# Patient Record
Sex: FEMALE | Race: WHITE | Employment: OTHER | URBAN - METROPOLITAN AREA
[De-identification: names, ages, dates, MRNs, and addresses within clinical notes are randomized per-mention and may not be internally consistent; named-entity substitution may affect disease eponyms.]

---

## 2017-06-08 ENCOUNTER — HOSPITAL ENCOUNTER (OUTPATIENT)
Dept: CT IMAGING | Age: 67
Discharge: HOME | End: 2017-06-08
Attending: NEUROLOGICAL SURGERY
Payer: MEDICARE

## 2017-06-08 DIAGNOSIS — Z98.2 PRESENCE OF CEREBROSPINAL FLUID DRAINAGE DEVICE: ICD-10-CM

## 2017-06-08 PROCEDURE — 70450 CT HEAD/BRAIN W/O DYE: CPT

## 2018-01-16 ENCOUNTER — OFFICE VISIT (OUTPATIENT)
Dept: FAMILY MEDICINE CLINIC | Age: 68
End: 2018-01-16

## 2018-01-16 VITALS
DIASTOLIC BLOOD PRESSURE: 77 MMHG | HEART RATE: 73 BPM | BODY MASS INDEX: 31.71 KG/M2 | HEIGHT: 63 IN | TEMPERATURE: 98 F | WEIGHT: 179 LBS | SYSTOLIC BLOOD PRESSURE: 136 MMHG | OXYGEN SATURATION: 95 % | RESPIRATION RATE: 17 BRPM

## 2018-01-16 DIAGNOSIS — N30.01 ACUTE CYSTITIS WITH HEMATURIA: Primary | ICD-10-CM

## 2018-01-16 LAB
BILIRUB UR QL STRIP: NEGATIVE
GLUCOSE UR-MCNC: NEGATIVE MG/DL
KETONES P FAST UR STRIP-MCNC: NEGATIVE MG/DL
PH UR STRIP: 7 [PH] (ref 4.6–8)
PROT UR QL STRIP: NEGATIVE
SP GR UR STRIP: 1.01 (ref 1–1.03)
UA UROBILINOGEN AMB POC: NORMAL (ref 0.2–1)
URINALYSIS CLARITY POC: NORMAL
URINALYSIS COLOR POC: NORMAL
URINE BLOOD POC: NORMAL
URINE LEUKOCYTES POC: NORMAL
URINE NITRITES POC: POSITIVE

## 2018-01-16 RX ORDER — NITROFURANTOIN 25; 75 MG/1; MG/1
100 CAPSULE ORAL 2 TIMES DAILY
Qty: 14 CAP | Refills: 0 | Status: SHIPPED | OUTPATIENT
Start: 2018-01-16 | End: 2018-01-23

## 2018-01-16 NOTE — PROGRESS NOTES
Subjective:     Josette Reyes is a 78 y.o. female who complains of dysuria, frequency, urgency for 1 days. Patient denies flank pain, vomiting, fever, unusual vaginal discharge. Patient does not have a history of recurrent UTI. Patient does not have a history of pyelonephritis. Patient Active Problem List   Diagnosis Code    Hydrocephalus G91.9    Shunt malfunction T85.618A     Patient Active Problem List    Diagnosis Date Noted    Shunt malfunction 10/14/2016    Hydrocephalus 04/12/2016     Current Outpatient Prescriptions   Medication Sig Dispense Refill    nitrofurantoin, macrocrystal-monohydrate, (MACROBID) 100 mg capsule Take 1 Cap by mouth two (2) times a day for 7 days. 14 Cap 0    losartan (COZAAR) 100 mg tablet Take 100 mg by mouth daily.  FLUoxetine (PROZAC) 20 mg capsule Take 20 mg by mouth daily.  b complex vitamins tablet Take 1 Tab by mouth daily.  clotrimazole-betamethasone (LOTRISONE) topical cream Apply  to affected area two (2) times daily as needed for Skin Irritation (under breasts).  desloratadine (CLARINEX) 5 mg tablet Take 5 mg by mouth daily as needed. Allergies   Allergen Reactions    Other Food Rash     POPPY SEED - RASH, ORAL SWELLING    Bactrim [Sulfamethoprim Ds] Rash    Chlorhexidine Towelette Hives    Corn Hives     Oral sores    Milk Hives     Oral sores when takes large liquid quantities, does not have reaction to foods baked with milk or small amounts of milk.      Past Medical History:   Diagnosis Date    Adverse effect of anesthesia     ABDOMINAL CRAMPING    Environmental allergies     HTN (hypertension)     Hydrocephalus     IBS (irritable bowel syndrome)     CONSTIPATION    MVP (mitral valve prolapse)      Past Surgical History:   Procedure Laterality Date    NEUROLOGICAL PROCEDURE UNLISTED  2000    third ventriculostomy     Family History   Problem Relation Age of Onset    Stroke Mother     Hypertension Mother     Heart Disease Father     Diabetes Father     Anesth Problems Neg Hx      Social History   Substance Use Topics    Smoking status: Former Smoker     Quit date: 7/30/1977    Smokeless tobacco: Never Used    Alcohol use Yes      Comment: occasional        Review of Systems  Pertinent items are noted in HPI. Objective:     Visit Vitals    /77 (BP 1 Location: Left arm, BP Patient Position: Sitting)    Pulse 73    Temp 98 °F (36.7 °C) (Oral)    Resp 17    Ht 5' 3\" (1.6 m)    Wt 179 lb (81.2 kg)    SpO2 95%    BMI 31.71 kg/m2     General:  alert, cooperative, no distress   Abdomen: soft, nontender, nondistended, no masses or organomegaly. Back:  CVA tenderness absent   :  defer exam     Laboratory:   Urine dipstick shows positive for RBC's, positive for nitrates and positive for leukocytes. Micro exam: not done. sent to the lab. Assessment/Plan:     Acute cystitis     1. nitrofurantoin  2. Maintain adequate hydration  3. May use OTC pyridium as desired, which will turn urine orange/red color  4. Follow up if symptoms not improving, and prn. ICD-10-CM ICD-9-CM    1. Acute cystitis with hematuria N30.01 595.0 nitrofurantoin, macrocrystal-monohydrate, (MACROBID) 100 mg capsule   .

## 2018-01-16 NOTE — PROGRESS NOTES
Chief Complaint   Patient presents with    Urinary Burning   pt c/o urinary burning x 1 day, pt states she took otc AZO to help with sx relief.

## 2018-01-16 NOTE — PATIENT INSTRUCTIONS

## 2018-01-17 LAB — BACTERIA UR CULT: NORMAL

## 2018-04-13 ENCOUNTER — APPOINTMENT (OUTPATIENT)
Dept: CT IMAGING | Age: 68
End: 2018-04-13
Attending: NURSE PRACTITIONER
Payer: MEDICARE

## 2018-04-13 ENCOUNTER — HOSPITAL ENCOUNTER (EMERGENCY)
Age: 68
Discharge: HOME | End: 2018-04-13
Attending: EMERGENCY MEDICINE | Admitting: INTERNAL MEDICINE
Payer: MEDICARE

## 2018-04-13 VITALS
WEIGHT: 188 LBS | HEART RATE: 82 BPM | TEMPERATURE: 98.1 F | DIASTOLIC BLOOD PRESSURE: 83 MMHG | BODY MASS INDEX: 33.3 KG/M2 | RESPIRATION RATE: 16 BRPM | OXYGEN SATURATION: 98 % | SYSTOLIC BLOOD PRESSURE: 130 MMHG

## 2018-04-13 DIAGNOSIS — R04.2 HEMOPTYSIS: Primary | ICD-10-CM

## 2018-04-13 DIAGNOSIS — R91.8 LUNG MASS: ICD-10-CM

## 2018-04-13 DIAGNOSIS — E27.8 ADRENAL MASS (HCC): ICD-10-CM

## 2018-04-13 LAB
ALBUMIN SERPL-MCNC: 3.9 G/DL (ref 3.5–5)
ALBUMIN/GLOB SERPL: 0.8 {RATIO} (ref 1.1–2.2)
ALP SERPL-CCNC: 68 U/L (ref 45–117)
ALT SERPL-CCNC: 32 U/L (ref 12–78)
ANION GAP SERPL CALC-SCNC: 4 MMOL/L (ref 5–15)
APTT PPP: 36.8 SEC (ref 22.1–32)
AST SERPL-CCNC: 19 U/L (ref 15–37)
BASOPHILS # BLD: 0 K/UL (ref 0–0.1)
BASOPHILS NFR BLD: 0 % (ref 0–1)
BILIRUB SERPL-MCNC: 0.3 MG/DL (ref 0.2–1)
BUN SERPL-MCNC: 13 MG/DL (ref 6–20)
BUN/CREAT SERPL: 14 (ref 12–20)
CALCIUM SERPL-MCNC: 10.1 MG/DL (ref 8.5–10.1)
CHLORIDE SERPL-SCNC: 106 MMOL/L (ref 97–108)
CO2 SERPL-SCNC: 28 MMOL/L (ref 21–32)
CREAT SERPL-MCNC: 0.93 MG/DL (ref 0.55–1.02)
DIFFERENTIAL METHOD BLD: NORMAL
EOSINOPHIL # BLD: 0 K/UL (ref 0–0.4)
EOSINOPHIL NFR BLD: 0 % (ref 0–7)
ERYTHROCYTE [DISTWIDTH] IN BLOOD BY AUTOMATED COUNT: 12.9 % (ref 11.5–14.5)
GLOBULIN SER CALC-MCNC: 4.6 G/DL (ref 2–4)
GLUCOSE SERPL-MCNC: 96 MG/DL (ref 65–100)
HCT VFR BLD AUTO: 44.2 % (ref 35–47)
HGB BLD-MCNC: 14.1 G/DL (ref 11.5–16)
IMM GRANULOCYTES # BLD: 0 K/UL (ref 0–0.04)
IMM GRANULOCYTES NFR BLD AUTO: 0 % (ref 0–0.5)
INR PPP: 1.1 (ref 0.9–1.1)
LYMPHOCYTES # BLD: 1.7 K/UL (ref 0.8–3.5)
LYMPHOCYTES NFR BLD: 28 % (ref 12–49)
MCH RBC QN AUTO: 29.2 PG (ref 26–34)
MCHC RBC AUTO-ENTMCNC: 31.9 G/DL (ref 30–36.5)
MCV RBC AUTO: 91.5 FL (ref 80–99)
MONOCYTES # BLD: 0.4 K/UL (ref 0–1)
MONOCYTES NFR BLD: 6 % (ref 5–13)
NEUTS SEG # BLD: 3.8 K/UL (ref 1.8–8)
NEUTS SEG NFR BLD: 65 % (ref 32–75)
NRBC # BLD: 0 K/UL (ref 0–0.01)
NRBC BLD-RTO: 0 PER 100 WBC
PLATELET # BLD AUTO: 357 K/UL (ref 150–400)
PMV BLD AUTO: 9.2 FL (ref 8.9–12.9)
POTASSIUM SERPL-SCNC: 4.3 MMOL/L (ref 3.5–5.1)
PROT SERPL-MCNC: 8.5 G/DL (ref 6.4–8.2)
PROTHROMBIN TIME: 10.8 SEC (ref 9–11.1)
RBC # BLD AUTO: 4.83 M/UL (ref 3.8–5.2)
SODIUM SERPL-SCNC: 138 MMOL/L (ref 136–145)
THERAPEUTIC RANGE,PTTT: ABNORMAL SECS (ref 58–77)
WBC # BLD AUTO: 5.9 K/UL (ref 3.6–11)

## 2018-04-13 PROCEDURE — 85610 PROTHROMBIN TIME: CPT | Performed by: EMERGENCY MEDICINE

## 2018-04-13 PROCEDURE — 71275 CT ANGIOGRAPHY CHEST: CPT

## 2018-04-13 PROCEDURE — 80053 COMPREHEN METABOLIC PANEL: CPT | Performed by: NURSE PRACTITIONER

## 2018-04-13 PROCEDURE — 85025 COMPLETE CBC W/AUTO DIFF WBC: CPT | Performed by: NURSE PRACTITIONER

## 2018-04-13 PROCEDURE — 74011000258 HC RX REV CODE- 258: Performed by: EMERGENCY MEDICINE

## 2018-04-13 PROCEDURE — 65270000029 HC RM PRIVATE

## 2018-04-13 PROCEDURE — 85730 THROMBOPLASTIN TIME PARTIAL: CPT | Performed by: EMERGENCY MEDICINE

## 2018-04-13 PROCEDURE — 99284 EMERGENCY DEPT VISIT MOD MDM: CPT

## 2018-04-13 PROCEDURE — 93005 ELECTROCARDIOGRAM TRACING: CPT

## 2018-04-13 PROCEDURE — 36415 COLL VENOUS BLD VENIPUNCTURE: CPT | Performed by: EMERGENCY MEDICINE

## 2018-04-13 PROCEDURE — 74011636320 HC RX REV CODE- 636/320: Performed by: EMERGENCY MEDICINE

## 2018-04-13 RX ORDER — SODIUM CHLORIDE 0.9 % (FLUSH) 0.9 %
10 SYRINGE (ML) INJECTION
Status: COMPLETED | OUTPATIENT
Start: 2018-04-13 | End: 2018-04-13

## 2018-04-13 RX ORDER — VALSARTAN 320 MG/1
320 TABLET ORAL DAILY
COMMUNITY
End: 2019-03-05 | Stop reason: ALTCHOICE

## 2018-04-13 RX ORDER — CHOLECALCIFEROL TAB 125 MCG (5000 UNIT) 125 MCG
5000 TAB ORAL DAILY
COMMUNITY

## 2018-04-13 RX ORDER — LEVOFLOXACIN 500 MG/1
500 TABLET, FILM COATED ORAL DAILY
COMMUNITY
End: 2019-03-05 | Stop reason: ALTCHOICE

## 2018-04-13 RX ADMIN — IOPAMIDOL 100 ML: 755 INJECTION, SOLUTION INTRAVENOUS at 14:04

## 2018-04-13 RX ADMIN — SODIUM CHLORIDE 100 ML: 900 INJECTION, SOLUTION INTRAVENOUS at 14:04

## 2018-04-13 RX ADMIN — Medication 10 ML: at 14:04

## 2018-04-13 NOTE — CONSULTS
Gaurav Franco MD, [facility]                   NAME:  Ary Mckenzie  :  1950  MRN:  [id number]  Date of Service:  2018    Reason for Consult: hemoptysis and lung mass    Consulting Physician: Dr Keyanna Murphy    Chief Complaint: coughing up blood    History of Present Illness: 79 yo woman with HTN, h/o hydrocephalus s/p  shunt, IBS, and h/o positive PPD and asbestosis exposure s/p INH x4 months who presents from home with coughing up blood since yesterday. She denies SOB, CP, palpitations, night sweats, weight loss, bleeding from elsewhere, HA, lightheadedness, dizziness, f/c/n/v. She takes ASA 3x weekly and Fannie-seltzer as needed. She reports \"gurgling\" and cough x4 months. She did not cough up blood until yesterday but has had multiple episodes since. ED triage VS were temp 98.2F, HR 87, /91, RR 18, SpO2 97% on RA. CTA chest in the ED showed left hilar mass, right adrenal mass, and no PE. Medical History:  Past Medical History:   Diagnosis Date    Adverse effect of anesthesia     ABDOMINAL CRAMPING    Environmental allergies     HTN (hypertension)     Hydrocephalus     IBS (irritable bowel syndrome)     CONSTIPATION    MVP (mitral valve prolapse)        Surgical History:  Past Surgical History:   Procedure Laterality Date    NEUROLOGICAL PROCEDURE UNLISTED      third ventriculostomy       Social History:  Social History     Social History    Marital status:      Spouse name: N/A    Number of children: N/A    Years of education: N/A     Occupational History    Not on file.      Social History Main Topics    Smoking status: Former Smoker     Quit date: 1977    Smokeless tobacco: Never Used    Alcohol use Yes      Comment: occasional    Drug use: No    Sexual activity: Not on file     Other Topics Concern    Not on file     Social History Narrative       Family History:  Family History   Problem Relation Age of Onset    Stroke Mother    [address] [facility] Abdi Hypertension Mother     Heart Disease Father     Diabetes Father     Anesth Problems Neg Hx        Allergies: Allergies   Allergen Reactions    Other Food Rash     POPPY SEED - RASH, ORAL SWELLING    Bactrim [Sulfamethoprim Ds] Rash    Chlorhexidine Towelette Hives    Corn Hives     Oral sores    Milk Hives     Oral sores when takes large liquid quantities, does not have reaction to foods baked with milk or small amounts of milk. Medications outpatient:  No current facility-administered medications on file prior to encounter. Current Outpatient Prescriptions on File Prior to Encounter   Medication Sig Dispense Refill    b complex vitamins tablet Take 1 Tab by mouth daily.  desloratadine (CLARINEX) 5 mg tablet Take 5 mg by mouth daily as needed. Medications inpatient:  No current facility-administered medications for this encounter. Current Outpatient Prescriptions   Medication Sig    levoFLOXacin (LEVAQUIN) 500 mg tablet Take 500 mg by mouth daily. For 7 days    valsartan (DIOVAN) 320 mg tablet Take 320 mg by mouth daily.  cholecalciferol, VITAMIN D3, (VITAMIN D3) 5,000 unit tab tablet Take 5,000 Units by mouth daily.  b complex vitamins tablet Take 1 Tab by mouth daily.  desloratadine (CLARINEX) 5 mg tablet Take 5 mg by mouth daily as needed. Review of Systems:  A comprehensive review of systems was negative except for that written in the HPI.     PHYSICAL EXAM:  Vital signs:  Visit Vitals    /77    Pulse 67    Temp 98.2 °F (36.8 °C)    Resp 15    Wt 85.3 kg (188 lb)    SpO2 97%    BMI 33.3 kg/m2        General: awake, no acute distress, cooperative   EENT: PERRL, EOMI, oral mucosa moist, oropharynx benign  Resp: few scattered b/l  rales, no wheeze  CV: regular rhythm, normal rate, no m/r/g appreciated, no peripheral edema, +pulses  GI: +BS, soft, non distended, non tender  MSK: moves all extremities  Neurologic: AAOx3, NFD  Psych: good insight, not anxious nor agitated  Skin: warm, dry    Lab Data Personally Reviewed: (see below)    ECG: NSR rate 68, no ST-T wave changes    CTA chest  CT Results  (Last 48 hours)               04/13/18 1448  CTA CHEST W OR W WO CONT Final result    Impression:  IMPRESSION:        Left hilar infiltrative appearing mass measuring 4.0 x 1.9 x 4.8 cm in size. Two cm right adrenal mass. Differential consideration would include   nonhyperfunctioning adenoma versus metastatic disease. PET imaging study may be   helpful for further characterization if indicated clinically. No evidence of pulmonary embolism. Incidental multiple rounded soft tissue structures in the left breast. Recommend   correlation with mammography and/or ultrasound. Telephone report to Dr. Dina Pickens in the emergency room at 1500 hours. Narrative:  INDICATION: Chest pain. Hemoptysis. EXAM: Preliminary  images were obtained. Multiple contiguous helically acquired images were obtained through the thorax   following intravenous contrast 100 ccs administration. 3D post processing was performed. Coronal reconstructions were performed. CT dose reduction was achieved through the use of a standardized protocol   tailored for this examination and automatic exposure control for dose   modulation. FINDINGS: Images through the pulmonary vessels reveal no filling defects or   meniscus to suggest pulmonary embolic process. No evidence of aortic dissection. Images through the left lynette reveal soft tissue fullness with mass effect. Left   hilar infiltrative appearing mass measuring 4.0 x 1.9 x 4.8 cm in size. Images through the upper abdomen demonstrate no gross space-occupying lesion   involving the visualized portion of the liver. The spleen is normal in size. Incidental note made of partly calcified right adrenal mass measuring 2.0 x 1.5   cm.                   Assessment/Plan:  Hemoptysis and left hilar lung mass (POA)  - pt is hemodynamically stable  - CTA chest as above  - spoke with Dr Ana Mike, [place] [facility], by phone who stated that patient would not be able to get bronchoscopy until Monday 4/16 at that earliest and if patient is stable and feels ok to go, she can follow up with PAR on Monday. [facility] office will call patient on Mon 4/16 to set up appointment and bronchoscopy with biopsy  - pt advised that if she does not hear from [id number] office on Mon am, she should call them  - pt also advised that if she has worsening hemoptysis, lightheadedness, CP, SOB, fatigue, etc she should return to the ED or call 911    Right adrenal mass (POA) - needs PET/CT; f/u with PCP and/or Heme/Onc    H/o +PPD and asbestosis exposure - no cavitary lung mass on CTA to suggest active TB  Patient is stable for discharge home as she is hemodynamically stable, Pulmonologist has been consulted, and patient is willing to go home and f/u with Pulmonary as an outpatient. Patient is agreeable to the plan and is aware she should return to the ED if symptoms arise or hemoptysis worsens. Care Plan discussed with:  Patient/Family, Nurse and Consultant Pulmonary Dr Ana Mike by phone    Total NON critical care TIME:  48 minutes    Alphonse Vega MD     Procedures: see electronic medical records for all procedures/Xrays and details which were not copied into this note but were reviewed prior to creation of Plan. LABS:  Recent Labs      04/13/18   1215   WBC  5.9   HGB  14.1   HCT  44.2   PLT  357     Recent Labs      04/13/18   1215   NA  138   K  4.3   CL  106   CO2  28   BUN  13   CREA  0.93   GLU  96   CA  10.1     Recent Labs      04/13/18   1215   SGOT  19   ALT  32   AP  68   TBILI  0.3   TP  8.5*   ALB  3.9   GLOB  4.6*     Recent Labs      04/13/18   1215   INR  1.1   PTP  10.8   APTT  36.8*      No results for input(s): FE, TIBC, PSAT, FERR in the last 72 hours.    No results found for: FOL, RBCF   No results for input(s): PH, PCO2, PO2 in the last 72 hours. No results for input(s): CPK, CKNDX, TROIQ in the last 72 hours.     No lab exists for component: CPKMB  No results found for: CHOL, CHOLX, CHLST, CHOLV, HDL, LDL, LDLC, DLDLP, TGLX, TRIGL, TRIGP, CHHD, CHHDX  No results found for: [facility]  Lab Results   Component Value Date/Time    Color YELLOW/STRAW 10/14/2016 08:58 PM    Appearance CLEAR 10/14/2016 08:58 PM    Specific gravity 1.008 10/14/2016 08:58 PM    pH (UA) 6.0 10/14/2016 08:58 PM    Protein NEGATIVE  10/14/2016 08:58 PM    Glucose NEGATIVE  10/14/2016 08:58 PM    Ketone NEGATIVE  10/14/2016 08:58 PM    Bilirubin NEGATIVE  10/14/2016 08:58 PM    Urobilinogen 0.2 10/14/2016 08:58 PM    Nitrites NEGATIVE  10/14/2016 08:58 PM    Leukocyte Esterase TRACE (A) 10/14/2016 08:58 PM    Epithelial cells FEW 10/14/2016 08:58 PM    Bacteria NEGATIVE  10/14/2016 08:58 PM    WBC 0-4 10/14/2016 08:58 PM    RBC 0-5 10/14/2016 08:58 PM

## 2018-04-13 NOTE — ED PROVIDER NOTES
HPI Comments: 78 y.o. female with past medical history significant for HTN and IBS who presents from home via private vehicle with chief complaint of hemptysis. There are no other acute medical concerns at this time. Social hx: Former smoker (Quit 6100); Occasional EtOH use  PCP: Pushpa Horowitz MD    Note written by Corrinne Alto, Scribe, as dictated by Petra Grant MD 11:58 AM        The history is provided by the patient. No  was used. Past Medical History:   Diagnosis Date    Adverse effect of anesthesia     ABDOMINAL CRAMPING    Environmental allergies     HTN (hypertension)     Hydrocephalus     IBS (irritable bowel syndrome)     CONSTIPATION    MVP (mitral valve prolapse)        Past Surgical History:   Procedure Laterality Date    NEUROLOGICAL PROCEDURE UNLISTED  2000    third ventriculostomy         Family History:   Problem Relation Age of Onset    Stroke Mother     Hypertension Mother     Heart Disease Father     Diabetes Father     Anesth Problems Neg Hx        Social History     Social History    Marital status:      Spouse name: N/A    Number of children: N/A    Years of education: N/A     Occupational History    Not on file. Social History Main Topics    Smoking status: Former Smoker     Quit date: 7/30/1977    Smokeless tobacco: Never Used    Alcohol use Yes      Comment: occasional    Drug use: No    Sexual activity: Not on file     Other Topics Concern    Not on file     Social History Narrative         ALLERGIES: Other food; Bactrim [sulfamethoprim ds]; Chlorhexidine towelette; Corn; and Milk    Review of Systems   Constitutional: Negative for activity change, appetite change and fatigue. HENT: Negative for ear pain, facial swelling, sore throat and trouble swallowing. Eyes: Negative for pain, discharge and visual disturbance. Respiratory: Negative for chest tightness, shortness of breath and wheezing. Cardiovascular: Negative for chest pain and palpitations. Gastrointestinal: Negative for abdominal pain, blood in stool, nausea and vomiting. Genitourinary: Negative for difficulty urinating, flank pain and hematuria. Musculoskeletal: Negative for arthralgias, joint swelling, myalgias and neck pain. Skin: Negative for color change and rash. Neurological: Negative for dizziness, weakness, numbness and headaches. Hematological: Negative for adenopathy. Does not bruise/bleed easily. Psychiatric/Behavioral: Negative for behavioral problems, confusion and sleep disturbance. All other systems reviewed and are negative. Vitals:    04/13/18 1050   BP: (!) 143/91   Pulse: 87   Resp: 18   Temp: 98.2 °F (36.8 °C)   SpO2: 97%   Weight: 85.3 kg (188 lb)            Physical Exam   Constitutional: She is oriented to person, place, and time. She appears well-developed and well-nourished. No distress. HENT:   Head: Normocephalic and atraumatic. Nose: Nose normal.   Mouth/Throat: Oropharynx is clear and moist.   Eyes: Conjunctivae and EOM are normal. Pupils are equal, round, and reactive to light. No scleral icterus. Neck: Normal range of motion. Neck supple. No JVD present. No tracheal deviation present. No thyromegaly present. No carotid bruits noted. Cardiovascular: Normal rate, regular rhythm, normal heart sounds and intact distal pulses. Exam reveals no gallop and no friction rub. No murmur heard. Pulmonary/Chest: Effort normal and breath sounds normal. No respiratory distress. She has no wheezes. She has no rales. She exhibits no tenderness. Abdominal: Soft. Bowel sounds are normal. She exhibits no distension and no mass. There is no tenderness. There is no rebound and no guarding. Musculoskeletal: Normal range of motion. She exhibits no edema or tenderness. Lymphadenopathy:     She has no cervical adenopathy. Neurological: She is alert and oriented to person, place, and time.  She has normal reflexes. No cranial nerve deficit. Coordination normal.   Skin: Skin is warm and dry. No rash noted. No erythema. Psychiatric: She has a normal mood and affect. Her behavior is normal. Judgment and thought content normal.   Nursing note and vitals reviewed. Note written by Moi Zheng, as dictated by Patrica Sutton MD 11:58 AM     MDM  Number of Diagnoses or Management Options  Hemoptysis:      Amount and/or Complexity of Data Reviewed  Clinical lab tests: ordered and reviewed  Tests in the radiology section of CPT®: ordered and reviewed  Decide to obtain previous medical records or to obtain history from someone other than the patient: yes  Review and summarize past medical records: yes  Discuss the patient with other providers: yes  Independent visualization of images, tracings, or specimens: yes    Risk of Complications, Morbidity, and/or Mortality  Presenting problems: high  Diagnostic procedures: high  Management options: high    Patient Progress  Patient progress: stable        ED Course       Procedures      ED EKG interpretation:  Rhythm: normal sinus rhythm; and regular . Rate (approx.): 68 bpm; Axis: left axis deviation; ST/T wave: normal; No ectopy; No ischemic changes. Note written by Moi Zheng, as dictated by Patrica Sutton MD 12:23 PM      CONSULT NOTE:  1:40 PM Patrica Sutton MD spoke with Dr. Tamika Cross, Consult for Pulmonology. Discussed available diagnostic tests and clinical findings. Dr. Tamika Cross states the chance this is TB is nill. WBC count is normal, CXR is clear, just waiting on CTA chest.      2:53 PM  Still awaiting the CT scan. The chest x-ray and labs are unremarkable. I have discussed with pulmonary and given the fact the patient has a normal chest x-ray, no constitutional symptoms to suggest TB, and normal vital signs, there is little likelihood of TB. CT scan returns with finding of hilar mass and adrenal mass.  Concern for lung CA with mets. Will consult hospitalist to admit for further evaluation and care. The patient expressed concern about being admitted to the hospital over the weekend and not having anything done. She will discussed with the hospitalist when he sees her and depending on the plans, she may be discharged home for followup next week.

## 2018-04-13 NOTE — CONSULTS
[facility]    D/W Dr. Candy Stephen. CT shows lung mass and adrenal lesions. She had an episode of hemoptysis yesterday, none since. Patient wants to go home. Needs bronch and EBUS for tissue biopsy. We will be in touch with her on Monday to schedule appt for evaluation and procedure.     Mauri Jean MD

## 2018-04-13 NOTE — ED NOTES
5:39 PM  Informed by Dr. Alonso Bui that patient is being discharged to follow up with pulmonology as an outpatient.

## 2018-04-13 NOTE — PROGRESS NOTES
Admission Medication Reconciliation:    Information obtained from:  Patient, RxQuery    Comments/Recommendations: Updated PTA meds/reviewed patient's allergies. 1)  Added:  - Levofloxacin 500 mg 1 tab daily for 7 days; patient is currently on day 2 of therapy and has already taken her dose today (4/13/18). - Valsartan 320 mg 1 tab daily  - Cholecalciferol 5000 units 1 tab daily    2)  Removed:  - Clotrimazole-betamethasone cream  - Fluoxetine  - Losartan  Patient indicated these are not current meds. 3)  Reviewed allergies with patient. No changes. Significant PMH/Disease States:   Past Medical History:   Diagnosis Date    Adverse effect of anesthesia     ABDOMINAL CRAMPING    Environmental allergies     HTN (hypertension)     Hydrocephalus     IBS (irritable bowel syndrome)     CONSTIPATION    MVP (mitral valve prolapse)        Chief Complaint for this Admission:    Chief Complaint   Patient presents with    Hemoptysis       Allergies: Other food; Bactrim [sulfamethoprim ds]; Chlorhexidine towelette; Corn; and Milk    Prior to Admission Medications:   Prior to Admission Medications   Prescriptions Last Dose Informant Patient Reported? Taking?   b complex vitamins tablet 4/13/2018 at Unknown time  Yes Yes   Sig: Take 1 Tab by mouth daily. cholecalciferol, VITAMIN D3, (VITAMIN D3) 5,000 unit tab tablet 4/13/2018 at Unknown time  Yes Yes   Sig: Take 5,000 Units by mouth daily. desloratadine (CLARINEX) 5 mg tablet 4/13/2018 at Unknown time  Yes Yes   Sig: Take 5 mg by mouth daily as needed. levoFLOXacin (LEVAQUIN) 500 mg tablet 4/13/2018 at Unknown time  Yes Yes   Sig: Take 500 mg by mouth daily. For 7 days   valsartan (DIOVAN) 320 mg tablet 4/13/2018 at Unknown time  Yes Yes   Sig: Take 320 mg by mouth daily.       Facility-Administered Medications: None         Written by Tanya Ontiveros, PharmD Candidate [id number]

## 2018-04-13 NOTE — Clinical Note
Status[de-identified] Inpatient [101] Type of Bed: Medical [8] Inpatient Hospitalization Certified Necessary for the Following Reasons: 3. Patient receiving treatment that can only be provided in an inpatient setting (further clarification in H&P documentation) Admitting Diagnosis: Hemoptysis [1414212] Admitting Physician: Betsy Day [[id number]] Attending Physician: Boogie Almaguer, [address] Estimated Length of Stay: 3-4 Midnights Discharge Plan[de-identified] Extended Care Facility (e.g. Adult Home, Nursing Home, etc.)

## 2018-04-13 NOTE — ED NOTES
10:50 AM  I have evaluated the patient as the Provider in Triage. I have reviewed Her vital signs and the triage nurse assessment. I have talked with the patient and any available family and advised that I am the provider in triage and have ordered the appropriate study to initiate their work up based on the clinical presentation during my assessment. I have advised that the patient will be accommodated in the Main ED as soon as possible. I have also requested to contact the triage nurse or myself immediately if the patient experiences any changes in their condition during this brief waiting period.   Barbara Crawford, NP

## 2018-04-13 NOTE — ED TRIAGE NOTES
TP arrives with report of coughing up bright red blood. 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 VSS.

## 2018-04-13 NOTE — ED NOTES
Discharge instructions reviewed with patient and , pulmonary phone number pointed and verbalization of understanding to make appt for follow up. Vital signs obtained, no respiratory distress noted and patient is ambulatory out of ED with steady gait and  at side.

## 2018-04-13 NOTE — DISCHARGE INSTRUCTIONS
Coughing Up Blood: Care Instructions  Your Care Instructions    Coughing up blood can be frightening. The blood may come from the lungs, stomach, or throat. You may cough up a few thin streaks of bright red blood. This probably is not a cause for concern. Coughing up large amounts of bright red blood or rust-colored mucus from the lungs can be a symptom of a more serious condition. Several conditions can make you cough up blood from the lungs. These include bronchitis and pneumonia, or more serious problems such as cancer or a blood clot in the lung (pulmonary embolus). Depending on what is causing your cough, it may go away after the illness is treated. Your doctor may tell you not to suppress the cough with cough medicine if it is better for you to cough up the blood and spit it out. Follow-up care is a key part of your treatment and safety. Be sure to make and go to all appointments, and call your doctor if you are having problems. It's also a good idea to know your test results and keep a list of the medicines you take. How can you care for yourself at home? · Make a note of when and for how long you cough up blood. Also note if you are coughing up spit with a small amount of blood, or mostly blood. Take this information to your next appointment with your doctor. · Increase your fluid intake to at least 8 to 10 glasses of water every day. This helps keep the mucus thin and helps you cough it up. If you have kidney, heart, or liver disease and have to limit fluids, talk with your doctor before you increase your fluid intake. · If your doctor prescribed antibiotics, take them as directed. Do not stop taking them just because you feel better. You need to take the full course of antibiotics. · Do not take cough medicine without your doctor's guidance. They can cause problems if you have other health problems. They can also interact with other medicine.   · Do not smoke or use other forms of tobacco, especially while you have a cough. Smoking can make coughing worse. If you need help quitting, talk to your doctor about stop-smoking programs and medicines. These can increase your chances of quitting for good. · Avoid exposure to smoke, dust, or other pollutants. When should you call for help? Call 911 anytime you think you may need emergency care. For example, call if:  ? · You have sudden chest pain and shortness of breath. ? · You have severe trouble breathing. ?Call your doctor now or seek immediate medical care if:  ? · You have wheezing and difficulty breathing. ? · You are dizzy or lightheaded, or you feel like you may faint. ? · You cough up clots of blood. ? Watch closely for changes in your health, and be sure to contact your doctor if:  ? · You do not get better as expected. ? · You have any new symptoms, such as chest pain with difficulty breathing or a fever. Where can you learn more? Go to http://pepe-joseph.info/. Enter M550 in the search box to learn more about \"Coughing Up Blood: Care Instructions. \"  Current as of: March 20, 2017  Content Version: 11.4  © 6866-8224 [employer]. Care instructions adapted under license by [employer] (which disclaims liability or warranty for this information). If you have questions about a medical condition or this instruction, always ask your healthcare professional. [address] any warranty or liability for your use of this information.

## 2018-04-14 LAB
ATRIAL RATE: 68 BPM
CALCULATED P AXIS, ECG09: 29 DEGREES
CALCULATED R AXIS, ECG10: -9 DEGREES
CALCULATED T AXIS, ECG11: 25 DEGREES
DIAGNOSIS, 93000: NORMAL
P-R INTERVAL, ECG05: 172 MS
Q-T INTERVAL, ECG07: 408 MS
QRS DURATION, ECG06: 82 MS
QTC CALCULATION (BEZET), ECG08: 433 MS
VENTRICULAR RATE, ECG03: 68 BPM

## 2018-04-18 ENCOUNTER — HOSPITAL ENCOUNTER (EMERGENCY)
Age: 68
Discharge: LEFT BEFORE DISCHARGE | End: 2018-04-18
Attending: EMERGENCY MEDICINE
Payer: MEDICARE

## 2018-04-18 VITALS
BODY MASS INDEX: 35.33 KG/M2 | HEART RATE: 117 BPM | SYSTOLIC BLOOD PRESSURE: 152 MMHG | HEIGHT: 62 IN | DIASTOLIC BLOOD PRESSURE: 89 MMHG | OXYGEN SATURATION: 95 % | RESPIRATION RATE: 16 BRPM | TEMPERATURE: 101.6 F | WEIGHT: 192 LBS

## 2018-04-18 PROCEDURE — 75810000275 HC EMERGENCY DEPT VISIT NO LEVEL OF CARE

## 2018-04-19 NOTE — ED NOTES
Pt upset with wait times and requesting to leave AMA and to 'not have a bill'. Allowed pt to deescalate. Pt educated on wait times and that blood will be drawn to determine more and a room will be available shortly. Pt still refusing to stay and talk to anyone.

## 2018-04-19 NOTE — ED TRIAGE NOTES
Patient present to the emergency department reporting fever following a bronchoscopy at [facility] today. Patient states she spoke with the pulmonologist who performed the bronchoscopy and was told to go to the ED. He believed patient had an infectious process not a mass, which was the concern for the bronchoscopy. Patient states he fever at home was 104.   Patient's temperature in triage was 101.6 after self-administering Tylenol prior to arrival.

## 2018-04-19 NOTE — ED NOTES
Charge Nurse out to speak with patient, pt was walking away after signing AMA. Dr. Benedicto Ramos aware of pt leaving.

## 2019-03-05 ENCOUNTER — OFFICE VISIT (OUTPATIENT)
Dept: FAMILY MEDICINE CLINIC | Age: 69
End: 2019-03-05

## 2019-03-05 VITALS
BODY MASS INDEX: 34.04 KG/M2 | DIASTOLIC BLOOD PRESSURE: 68 MMHG | HEART RATE: 83 BPM | TEMPERATURE: 98.3 F | SYSTOLIC BLOOD PRESSURE: 115 MMHG | HEIGHT: 62 IN | WEIGHT: 185 LBS | OXYGEN SATURATION: 99 % | RESPIRATION RATE: 16 BRPM

## 2019-03-05 DIAGNOSIS — H65.92 LEFT NON-SUPPURATIVE OTITIS MEDIA: Primary | ICD-10-CM

## 2019-03-05 RX ORDER — AMOXICILLIN 875 MG/1
875 TABLET, FILM COATED ORAL 2 TIMES DAILY
Qty: 20 TAB | Refills: 0 | Status: SHIPPED | OUTPATIENT
Start: 2019-03-05 | End: 2019-03-15

## 2019-03-05 RX ORDER — OLMESARTAN MEDOXOMIL 40 MG/1
TABLET ORAL
COMMUNITY
Start: 2019-01-11

## 2019-03-05 NOTE — PROGRESS NOTES
Subjective:      Vonnie Gaxiola is a 80 y.o. female who presents for possible ear infection. Symptoms include left ear pain, congestion and swollen glands. Onset of symptoms was 3 days ago, gradually worsening since that time. Associated symptoms include achiness, congestion and post nasal drip, which have been present for 2 days . She is drinking plenty of fluids. Problem List:     Patient Active Problem List    Diagnosis Date Noted    Hemoptysis 04/13/2018    Lung mass 04/13/2018    Right adrenal mass ([address] 04/13/2018    Shunt malfunction 10/14/2016    Hydrocephalus 04/12/2016     Medical History:     Past Medical History:   Diagnosis Date    Adverse effect of anesthesia     ABDOMINAL CRAMPING    Environmental allergies     HTN (hypertension)     Hydrocephalus     IBS (irritable bowel syndrome)     CONSTIPATION    MVP (mitral valve prolapse)      Allergies: Allergies   Allergen Reactions    Other Food Rash     POPPY SEED - RASH, ORAL SWELLING    Bactrim [Sulfamethoprim Ds] Rash    Chlorhexidine Towelette Hives    Corn Hives     Oral sores    Milk Hives     Oral sores when takes large liquid quantities, does not have reaction to foods baked with milk or small amounts of milk. Medications:     Current Outpatient Medications   Medication Sig    olmesartan (BENICAR) 40 mg tablet     amoxicillin (AMOXIL) 875 mg tablet Take 1 Tab by mouth two (2) times a day for 10 days.  cholecalciferol, VITAMIN D3, (VITAMIN D3) 5,000 unit tab tablet Take 5,000 Units by mouth daily.  b complex vitamins tablet Take 1 Tab by mouth daily.  desloratadine (CLARINEX) 5 mg tablet Take 5 mg by mouth daily as needed.  levoFLOXacin (LEVAQUIN) 500 mg tablet Take 500 mg by mouth daily. For 7 days    valsartan (DIOVAN) 320 mg tablet Take 320 mg by mouth daily. No current facility-administered medications for this visit.       Surgical History:     Past Surgical History:   Procedure Laterality Date  NEUROLOGICAL PROCEDURE UNLISTED  2000    third ventriculostomy     Social History:     Social History     Socioeconomic History    Marital status:      Spouse name: Not on file    Number of children: Not on file    Years of education: Not on file    Highest education level: Not on file   Tobacco Use    Smoking status: Former Smoker     Last attempt to quit: 1977     Years since quittin.6    Smokeless tobacco: Never Used   Substance and Sexual Activity    Alcohol use: Yes     Comment: occasional    Drug use: No         Objective:     ROS:   Feeling well. No dyspnea or chest pain on exertion. No abdominal pain, change in bowel habits, black or bloody stools. No urinary tract symptoms. GYN ROS: normal menses, no abnormal bleeding, pelvic pain or discharge, no breast pain or new or enlarging lumps on self exam. No neurological complaints. OBJECTIVE:   The patient appears well, alert, oriented x 3, in no distress. Visit Vitals  /68   Pulse 83   Temp 98.3 °F (36.8 °C) (Oral)   Resp 16   Ht 5' 2\" (1.575 m)   Wt 185 lb (83.9 kg)   SpO2 99%   BMI 33.84 kg/m²     HEENT:ENT right ear normal.Left TM red, bulging. Neck supple. No adenopathy or thyromegaly. JACY. Chest: Lungs are clear, good air entry, no wheezes, rhonchi or rales. Cardiovascular: S1 and S2 normal, no murmurs, regular rate and rhythm. Abdomen: soft without tenderness, guarding, mass or organomegaly. Extremities: show no edema, normal peripheral pulses. Neurological: is normal, no focal findings. Assessment/Plan:       ICD-10-CM ICD-9-CM    1. Left non-suppurative otitis media H65.92 381.4 amoxicillin (AMOXIL) 875 mg tablet   .

## 2019-03-05 NOTE — PROGRESS NOTES
Chief Complaint   Patient presents with    Ear Pain     Pt c/o left outer ear pain, onset this morning.

## 2019-03-05 NOTE — PATIENT INSTRUCTIONS

## 2019-03-21 ENCOUNTER — OFFICE VISIT (OUTPATIENT)
Dept: FAMILY MEDICINE CLINIC | Age: 69
End: 2019-03-21

## 2019-03-21 VITALS
RESPIRATION RATE: 16 BRPM | WEIGHT: 185 LBS | DIASTOLIC BLOOD PRESSURE: 90 MMHG | BODY MASS INDEX: 34.04 KG/M2 | OXYGEN SATURATION: 97 % | HEART RATE: 84 BPM | HEIGHT: 62 IN | SYSTOLIC BLOOD PRESSURE: 143 MMHG | TEMPERATURE: 98.2 F

## 2019-03-21 DIAGNOSIS — R35.0 FREQUENCY OF URINATION: ICD-10-CM

## 2019-03-21 DIAGNOSIS — H93.8X3 EAR FULLNESS, BILATERAL: ICD-10-CM

## 2019-03-21 DIAGNOSIS — R82.90 ABNORMAL URINALYSIS: ICD-10-CM

## 2019-03-21 DIAGNOSIS — R39.15 URGENCY OF URINATION: Primary | ICD-10-CM

## 2019-03-21 DIAGNOSIS — N30.01 ACUTE CYSTITIS WITH HEMATURIA: ICD-10-CM

## 2019-03-21 LAB
BILIRUB UR QL STRIP: NEGATIVE
GLUCOSE UR-MCNC: NEGATIVE MG/DL
KETONES P FAST UR STRIP-MCNC: NEGATIVE MG/DL
PH UR STRIP: 7 [PH] (ref 4.6–8)
PROT UR QL STRIP: NEGATIVE
SP GR UR STRIP: 1.01 (ref 1–1.03)
UA UROBILINOGEN AMB POC: NORMAL (ref 0.2–1)
URINALYSIS CLARITY POC: NORMAL
URINALYSIS COLOR POC: YELLOW
URINE BLOOD POC: NORMAL
URINE LEUKOCYTES POC: NORMAL
URINE NITRITES POC: NEGATIVE

## 2019-03-21 RX ORDER — FLUTICASONE PROPIONATE 50 MCG
SPRAY, SUSPENSION (ML) NASAL
Qty: 1 BOTTLE | Refills: 0 | Status: SHIPPED | OUTPATIENT
Start: 2019-03-21

## 2019-03-21 RX ORDER — NITROFURANTOIN 25; 75 MG/1; MG/1
100 CAPSULE ORAL 2 TIMES DAILY
Qty: 10 CAP | Refills: 0 | Status: SHIPPED | OUTPATIENT
Start: 2019-03-21 | End: 2019-03-26

## 2019-03-21 NOTE — PROGRESS NOTES
Subjective:     Dorthy Scheuermann is a 80 y.o. female who complains of dysuria, frequency, urgency, suprapubic pressure for 1 day. Patient denies flank pain, vomiting, fever, unusual vaginal discharge. Patient does not have a history of recurrent UTI. Patient does not have a history of pyelonephritis. Patient had ear infection a few weeks ago and reports completing a course of amoxicllin. She noticed improvement but has noticed some ear fullness returning over the past few days. She days claritin daily for allergies. Patient Active Problem List   Diagnosis Code    Hydrocephalus G91.9    Shunt malfunction T85.618A    Hemoptysis R04.2    Lung mass R91.8    Right adrenal mass (HCC) E27.9     Patient Active Problem List    Diagnosis Date Noted    Hemoptysis 04/13/2018    Lung mass 04/13/2018    Right adrenal mass ([address] 04/13/2018    Shunt malfunction 10/14/2016    Hydrocephalus 04/12/2016     Current Outpatient Medications   Medication Sig Dispense Refill    fluticasone propionate (FLONASE) 50 mcg/actuation nasal spray 2 sprays per nostril per day 1 Bottle 0    nitrofurantoin, macrocrystal-monohydrate, (MACROBID) 100 mg capsule Take 1 Cap by mouth two (2) times a day for 5 days. 10 Cap 0    olmesartan (BENICAR) 40 mg tablet       cholecalciferol, VITAMIN D3, (VITAMIN D3) 5,000 unit tab tablet Take 5,000 Units by mouth daily.  b complex vitamins tablet Take 1 Tab by mouth daily.  desloratadine (CLARINEX) 5 mg tablet Take 5 mg by mouth daily as needed. Allergies   Allergen Reactions    Other Food Rash     POPPY SEED - RASH, ORAL SWELLING    Bactrim [Sulfamethoprim Ds] Rash    Chlorhexidine Towelette Hives    Corn Hives     Oral sores    Milk Hives     Oral sores when takes large liquid quantities, does not have reaction to foods baked with milk or small amounts of milk.      Past Medical History:   Diagnosis Date    Adverse effect of anesthesia     ABDOMINAL CRAMPING    Environmental allergies     HTN (hypertension)     Hydrocephalus     IBS (irritable bowel syndrome)     CONSTIPATION    MVP (mitral valve prolapse)      Past Surgical History:   Procedure Laterality Date    NEUROLOGICAL PROCEDURE UNLISTED      third ventriculostomy     Family History   Problem Relation Age of Onset    Stroke Mother     Hypertension Mother     Heart Disease Father     Diabetes Father     Anesth Problems Neg Hx      Social History     Tobacco Use    Smoking status: Former Smoker     Last attempt to quit: 1977     Years since quittin.6    Smokeless tobacco: Never Used   Substance Use Topics    Alcohol use: Yes     Comment: occasional        Review of Systems  Pertinent items are noted in HPI. Objective:     Visit Vitals  /90   Pulse 84   Temp 98.2 °F (36.8 °C) (Oral)   Resp 16   Ht 5' 2\" (1.575 m)   Wt 185 lb (83.9 kg)   SpO2 97%   BMI 33.84 kg/m²     General:  alert, cooperative, no distress   Abdomen: not examined. Back:  CVA tenderness absent   :  defer exam   Ears- tm normal bilaterally; no erythema  Nose- bilateral enlarged of turbinates with some clear mucus    Laboratory:   Urine dipstick shows positive for RBC's and positive for leukocytes. Micro exam: not done. Assessment/Plan:     UTI, Acute cystitis     1. nitrofurantoin  2. Maintain adequate hydration  3. May use OTC pyridium as desired, which will turn urine orange/red color  4. Follow up if symptoms not improving, and prn. ICD-10-CM ICD-9-CM    1. Urgency of urination R39.15 788.63 AMB POC URINALYSIS DIP STICK MANUAL W/O MICRO   2. Frequency of urination R35.0 788.41 AMB POC URINALYSIS DIP STICK MANUAL W/O MICRO   3. Ear fullness, bilateral H93.8X3 388.8    4. Abnormal urinalysis R82.90 791.9 CULTURE, URINE   5.  Acute cystitis with hematuria N30.01 595.0      Orders Placed This Encounter    CULTURE, URINE    AMB POC URINALYSIS DIP STICK MANUAL W/O MICRO    fluticasone propionate (FLONASE) 50 mcg/actuation nasal spray    nitrofurantoin, macrocrystal-monohydrate, (MACROBID) 100 mg capsule   .

## 2019-03-21 NOTE — PROGRESS NOTES
Chief Complaint   Patient presents with    Urgency     Abdominal pressure and having to go alot symptoms started yesterday

## 2019-03-22 LAB — BACTERIA UR CULT: NORMAL

## 2019-03-25 ENCOUNTER — TELEPHONE (OUTPATIENT)
Dept: PRIMARY CARE CLINIC | Age: 69
End: 2019-03-25

## 2019-03-25 NOTE — TELEPHONE ENCOUNTER
----- Message from Poncho aTte NP sent at 3/22/2019  4:56 PM EDT -----  Please notify patient that urine culture did not indicate UTI.  She may stop the antibiotic.  ----- Message -----  From: Geoff Newell LPN  Sent: 4/89/0383  11:41 AM  To: Poncho Tate NP

## 2021-09-21 ENCOUNTER — TRANSCRIBE ORDER (OUTPATIENT)
Dept: SCHEDULING | Age: 71
End: 2021-09-21

## 2021-09-21 DIAGNOSIS — G91.9 HYDROCEPHALUS (HCC): Primary | ICD-10-CM

## 2021-09-27 ENCOUNTER — HOSPITAL ENCOUNTER (OUTPATIENT)
Dept: CT IMAGING | Age: 71
Discharge: HOME | End: 2021-09-27
Attending: NEUROLOGICAL SURGERY
Payer: MEDICARE

## 2021-09-27 DIAGNOSIS — G91.9 HYDROCEPHALUS (HCC): ICD-10-CM

## 2021-09-27 PROCEDURE — 70450 CT HEAD/BRAIN W/O DYE: CPT

## 2022-03-18 PROBLEM — E27.8 RIGHT ADRENAL MASS (HCC): Status: ACTIVE | Noted: 2018-04-13

## 2022-03-19 PROBLEM — R04.2 HEMOPTYSIS: Status: ACTIVE | Noted: 2018-04-13

## 2022-03-19 PROBLEM — R91.8 LUNG MASS: Status: ACTIVE | Noted: 2018-04-13

## 2025-02-04 NOTE — TELEPHONE ENCOUNTER
----- Message from Yesy Mendiola NP sent at 3/22/2019  4:56 PM EDT -----  Please notify patient that urine culture did not indicate UTI.  She may stop the antibiotic.  ----- Message -----  From: Denver Lease, LPN  Sent: [phone]/69/5588  11:41 AM  To: Yesy Mendiola NP
Patient returned call and verified patient identifiers. Patient given lab results and instructions per Evangelista Yang NP and verbalized understanding.
15

## 2025-08-05 ENCOUNTER — TRANSCRIBE ORDERS (OUTPATIENT)
Facility: HOSPITAL | Age: 75
End: 2025-08-05

## 2025-08-05 DIAGNOSIS — G91.2 NPH (NORMAL PRESSURE HYDROCEPHALUS) (HCC): Primary | ICD-10-CM

## 2025-08-07 ENCOUNTER — HOSPITAL ENCOUNTER (OUTPATIENT)
Facility: HOSPITAL | Age: 75
Discharge: HOME | End: 2025-08-10
Attending: NEUROLOGICAL SURGERY
Payer: MEDICARE

## 2025-08-07 DIAGNOSIS — G91.2 NPH (NORMAL PRESSURE HYDROCEPHALUS) (HCC): ICD-10-CM

## 2025-08-07 PROCEDURE — 70450 CT HEAD/BRAIN W/O DYE: CPT
